# Patient Record
Sex: FEMALE | Race: BLACK OR AFRICAN AMERICAN | NOT HISPANIC OR LATINO | Employment: FULL TIME | URBAN - METROPOLITAN AREA
[De-identification: names, ages, dates, MRNs, and addresses within clinical notes are randomized per-mention and may not be internally consistent; named-entity substitution may affect disease eponyms.]

---

## 2017-01-06 ENCOUNTER — TELEPHONE (OUTPATIENT)
Dept: OBSTETRICS AND GYNECOLOGY | Facility: CLINIC | Age: 27
End: 2017-01-06

## 2017-01-06 NOTE — TELEPHONE ENCOUNTER
I spoke with pt she would like to get another Mirena her IUD is due out in 2/2017.  Per Dr. Gunter's note she instructed pt to call in January with a decision and after verify if it would be covered by her insurance company.  I informed pt we will get the process started and will follow-up with her once we here back from company.  She voiced understanding.

## 2017-01-09 ENCOUNTER — TELEPHONE (OUTPATIENT)
Dept: OBSTETRICS AND GYNECOLOGY | Facility: CLINIC | Age: 27
End: 2017-01-09

## 2017-01-18 ENCOUNTER — TELEPHONE (OUTPATIENT)
Dept: OBSTETRICS AND GYNECOLOGY | Facility: CLINIC | Age: 27
End: 2017-01-18

## 2017-01-18 NOTE — TELEPHONE ENCOUNTER
Mirena received at the [place] location.  Left voice message with patient to call and schedule an appt for insertion

## 2017-01-25 ENCOUNTER — TELEPHONE (OUTPATIENT)
Dept: OBSTETRICS AND GYNECOLOGY | Facility: CLINIC | Age: 27
End: 2017-01-25

## 2017-02-03 ENCOUNTER — TELEPHONE (OUTPATIENT)
Dept: OBSTETRICS AND GYNECOLOGY | Facility: CLINIC | Age: 27
End: 2017-02-03

## 2017-02-03 NOTE — TELEPHONE ENCOUNTER
----- Message from Zara Adamson sent at 2/3/2017  8:27 AM CST -----  Pt at [phone]//or [phone]//states she has a procedure scheduled on 2-7-17 at 7:45am//is wanting to know if possible to reschedule for a later time that day//please call//desean/lh

## 2017-02-03 NOTE — TELEPHONE ENCOUNTER
----- Message from Zara Adamson sent at 2/3/2017  8:27 AM CST -----  Pt at [phone]//or [phone]//states she has a procedure scheduled on 2-7-17 at 7:45am//is wanting to know if possible to reschedule for a later time that day//please call//desean/lh

## 2017-02-07 ENCOUNTER — PROCEDURE VISIT (OUTPATIENT)
Dept: OBSTETRICS AND GYNECOLOGY | Facility: CLINIC | Age: 27
End: 2017-02-07
Payer: COMMERCIAL

## 2017-02-07 DIAGNOSIS — Z30.433 ENCOUNTER FOR IUD REMOVAL AND REINSERTION: Primary | ICD-10-CM

## 2017-02-07 PROCEDURE — 58300 INSERT INTRAUTERINE DEVICE: CPT | Mod: 51,S$GLB,, | Performed by: ADVANCED PRACTICE MIDWIFE

## 2017-02-07 PROCEDURE — 58301 REMOVE INTRAUTERINE DEVICE: CPT | Mod: S$GLB,,, | Performed by: ADVANCED PRACTICE MIDWIFE

## 2017-02-07 NOTE — PROCEDURES
Procedures   IUD Insertion Procedure Note    Pre-operative Diagnosis: IUD removal and insertion    Post-operative Diagnosis: same    Indications: contraception    Procedure Details   Urine pregnancy test was done 2/7/17 and result was negative.  The risks (including infection, bleeding, pain, and uterine perforation) and benefits of the procedure were explained to the patient and Written informed consent was obtained.      IUD strings visualized and grasped with ring forceps, IUD was removed without difficulty. Cervix cleansed with Betadine. Uterus sounded to 6 cm. IUD inserted without difficulty. String visible and trimmed. Patient tolerated procedure well.    IUD Information:  Mirena, Lot # [id number], Expiration date 09/19.    Condition:  Stable    Complications:  None    Plan:    The patient was advised to call for any fever or for prolonged or severe pain or bleeding. She was advised to use OTC ibuprofen as needed for mild to moderate pain.     Attending Physician Documentation:  I was present for or participated in the entire procedure.

## 2017-02-09 ENCOUNTER — TELEPHONE (OUTPATIENT)
Dept: OBSTETRICS AND GYNECOLOGY | Facility: CLINIC | Age: 27
End: 2017-02-09

## 2017-02-09 NOTE — TELEPHONE ENCOUNTER
----- Message from Leatha Severino sent at 2/9/2017  8:49 AM CST -----  Contact: pt   Call p[t regarding having issues after her surgery.   [phone] or .[phone] (home)

## 2017-02-09 NOTE — TELEPHONE ENCOUNTER
----- Message from Martha Willoughby sent at 2/9/2017  2:18 PM CST -----  Contact: pt  Pt has been bleeding heavily with many clots and is having very painful cramps ever since having the Mirena inserted - please call pt to advise at [phone] or [phone]

## 2017-02-21 ENCOUNTER — OFFICE VISIT (OUTPATIENT)
Dept: OBSTETRICS AND GYNECOLOGY | Facility: CLINIC | Age: 27
End: 2017-02-21
Payer: COMMERCIAL

## 2017-02-21 VITALS
WEIGHT: 293 LBS | BODY MASS INDEX: 50.02 KG/M2 | DIASTOLIC BLOOD PRESSURE: 84 MMHG | HEIGHT: 64 IN | SYSTOLIC BLOOD PRESSURE: 132 MMHG

## 2017-02-21 DIAGNOSIS — Z30.431 IUD CHECK UP: Primary | ICD-10-CM

## 2017-02-21 PROCEDURE — 99999 PR PBB SHADOW E&M-EST. PATIENT-LVL II: CPT | Mod: PBBFAC,,, | Performed by: ADVANCED PRACTICE MIDWIFE

## 2017-02-21 PROCEDURE — 99212 OFFICE O/P EST SF 10 MIN: CPT | Mod: S$GLB,,, | Performed by: ADVANCED PRACTICE MIDWIFE

## 2017-02-21 RX ORDER — HYDROCHLOROTHIAZIDE 25 MG/1
25 TABLET ORAL DAILY
COMMUNITY

## 2017-02-21 NOTE — PROGRESS NOTES
Pt here today for follow up from IUD placement 3 weeks ago. Still having some spotting. Denies pain. Speculum placed, IUD strings visualized. bg

## 2017-08-31 ENCOUNTER — OFFICE VISIT (OUTPATIENT)
Dept: OBSTETRICS AND GYNECOLOGY | Facility: CLINIC | Age: 27
End: 2017-08-31
Payer: COMMERCIAL

## 2017-08-31 VITALS
SYSTOLIC BLOOD PRESSURE: 122 MMHG | BODY MASS INDEX: 48.82 KG/M2 | HEIGHT: 65 IN | WEIGHT: 293 LBS | DIASTOLIC BLOOD PRESSURE: 82 MMHG

## 2017-08-31 DIAGNOSIS — R30.0 DYSURIA: ICD-10-CM

## 2017-08-31 DIAGNOSIS — Z01.419 ENCOUNTER FOR ANNUAL ROUTINE GYNECOLOGICAL EXAMINATION: ICD-10-CM

## 2017-08-31 DIAGNOSIS — Z71.1 CONCERN ABOUT STD IN FEMALE WITHOUT DIAGNOSIS: Primary | ICD-10-CM

## 2017-08-31 PROCEDURE — 99999 PR PBB SHADOW E&M-EST. PATIENT-LVL III: CPT | Mod: PBBFAC,,, | Performed by: NURSE PRACTITIONER

## 2017-08-31 PROCEDURE — 99213 OFFICE O/P EST LOW 20 MIN: CPT | Mod: S$GLB,,, | Performed by: NURSE PRACTITIONER

## 2017-08-31 PROCEDURE — 87086 URINE CULTURE/COLONY COUNT: CPT

## 2017-08-31 PROCEDURE — 88175 CYTOPATH C/V AUTO FLUID REDO: CPT

## 2017-08-31 PROCEDURE — 87591 N.GONORRHOEAE DNA AMP PROB: CPT

## 2017-08-31 PROCEDURE — 3008F BODY MASS INDEX DOCD: CPT | Mod: S$GLB,,, | Performed by: NURSE PRACTITIONER

## 2017-08-31 NOTE — PROGRESS NOTES
"CC: Possible UTI    Tu Doe is a 27 y.o. female  presents for concerns of possible UTI. Patient states that she has recurrent UTIs. No dysuria or pelvic pain. Urine in clinic, indicated blood  ( on her cycle). Patient states that " boyfriend was seen in ED and given azithromycin and she needed to be checked".   LMP: Patient's last menstrual period was 2017..     Past Medical History:   Diagnosis Date    Hypertension      Past Surgical History:   Procedure Laterality Date     SECTION  2011     Social History     Social History    Marital status: Single     Spouse name: N/A    Number of children: N/A    Years of education: N/A     Occupational History    Not on file.     Social History Main Topics    Smoking status: Never Smoker    Smokeless tobacco: Never Used    Alcohol use No    Drug use: No    Sexual activity: Yes     Partners: Male     Birth control/ protection: IUD     Other Topics Concern    Not on file     Social History Narrative    No narrative on file     Family History   Problem Relation Age of Onset    Hypertension Maternal Grandmother     Diabetes Maternal Grandmother     Stroke Maternal Grandfather     Hypertension Mother      OB History      Para Term  AB Living    1 1 1     1    SAB TAB Ectopic Multiple Live Births            1          /82 (BP Location: Right arm, Patient Position: Sitting, BP Method: Medium (Manual))   Ht 5' 5" (1.651 m)   Wt (!) 148.6 kg (327 lb 9.7 oz)   LMP 2017   BMI 54.52 kg/m²       ROS:  GENERAL: Denies weight gain or weight loss. Feeling well overall.   SKIN: Denies rash or lesions.   HEAD: Denies head injury or headache.   NODES: Denies enlarged lymph nodes.   CHEST: Denies chest pain or shortness of breath.   CARDIOVASCULAR: Denies palpitations or left sided chest pain.   ABDOMEN: No abdominal pain, constipation, diarrhea, nausea, vomiting or rectal bleeding. "   URINARY:HPI  REPRODUCTIVE: See HPI.        PHYSICAL EXAM:  APPEARANCE: Obese AA female , in no acute distress.  CHEST: Good respiratory effect  ABDOMEN: Soft.  No tenderness or masses.   PELVIC: Normal external genitalia without lesions.  Normal hair distribution.  Adequate perineal body, normal urethral meatus.  Vagina moist and well rugated without lesions or discharge.  Cervix pink, without lesions, discharge or tenderness.   Bimanual exam shows uterus to be normal size, regular, mobile and nontender.  Adnexa without masses or tenderness.    EXTREMITIES: No edema.    1. Concern about STD in female without diagnosis  C. trachomatis/N. gonorrhoeae by AMP DNA Cervicovaginal   2. Encounter for annual routine gynecological examination  Liquid-based pap smear, screening   3. Dysuria  Urine culture    PLAN:  Urine sent for cx  Pap exam  GC cx    Patient was counseled today on A.C.S. Pap guidelines and recommendations for yearly pelvic exams, mammograms and monthly self breast exams; to see her PCP for other health maintenance.

## 2017-09-01 LAB
BACTERIA UR CULT: NO GROWTH
C TRACH DNA SPEC QL NAA+PROBE: NOT DETECTED
N GONORRHOEA DNA SPEC QL NAA+PROBE: NOT DETECTED
